# Patient Record
Sex: FEMALE | Race: BLACK OR AFRICAN AMERICAN
[De-identification: names, ages, dates, MRNs, and addresses within clinical notes are randomized per-mention and may not be internally consistent; named-entity substitution may affect disease eponyms.]

---

## 2017-01-15 ENCOUNTER — HOSPITAL ENCOUNTER (EMERGENCY)
Dept: HOSPITAL 61 - ER | Age: 22
Discharge: HOME | End: 2017-01-15
Payer: SELF-PAY

## 2017-01-15 VITALS — WEIGHT: 163 LBS | BODY MASS INDEX: 32.86 KG/M2 | HEIGHT: 59 IN

## 2017-01-15 VITALS — SYSTOLIC BLOOD PRESSURE: 127 MMHG | DIASTOLIC BLOOD PRESSURE: 76 MMHG

## 2017-01-15 DIAGNOSIS — M25.522: Primary | ICD-10-CM
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
## 2017-01-15 DIAGNOSIS — F12.10: ICD-10-CM
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
## 2017-01-15 DIAGNOSIS — M25.562: ICD-10-CM

## 2017-01-15 LAB
BACTERIA #/AREA URNS HPF: (no result) /HPF
BILIRUB UR QL STRIP: (no result)
GLUCOSE UR STRIP-MCNC: NEGATIVE MG/DL
NEG OBC UR: (no result)
NITRITE UR QL STRIP: NEGATIVE
PH UR STRIP: 6 [PH]
POS OBC UR: (no result)
PROT UR STRIP-MCNC: 30 MG/DL
RBC #/AREA URNS HPF: (no result) /HPF (ref 0–2)
SP GR UR STRIP: >=1.03
SQUAMOUS #/AREA URNS LPF: (no result) /LPF
UROBILINOGEN UR-MCNC: 1 MG/DL
WBC #/AREA URNS HPF: (no result) /HPF (ref 0–4)

## 2017-01-15 PROCEDURE — 81025 URINE PREGNANCY TEST: CPT

## 2017-01-15 PROCEDURE — 73080 X-RAY EXAM OF ELBOW: CPT

## 2017-01-15 PROCEDURE — 87086 URINE CULTURE/COLONY COUNT: CPT

## 2017-01-15 PROCEDURE — 81001 URINALYSIS AUTO W/SCOPE: CPT

## 2017-01-15 PROCEDURE — 73090 X-RAY EXAM OF FOREARM: CPT

## 2017-01-15 PROCEDURE — 70486 CT MAXILLOFACIAL W/O DYE: CPT

## 2017-01-15 PROCEDURE — 73564 X-RAY EXAM KNEE 4 OR MORE: CPT
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
## 2017-03-20 ENCOUNTER — HOSPITAL ENCOUNTER (EMERGENCY)
Dept: HOSPITAL 61 - ER | Age: 22
Discharge: HOME | End: 2017-03-20
Payer: SELF-PAY

## 2017-03-20 VITALS — DIASTOLIC BLOOD PRESSURE: 74 MMHG | SYSTOLIC BLOOD PRESSURE: 120 MMHG

## 2017-03-20 VITALS — BODY MASS INDEX: 32.66 KG/M2 | HEIGHT: 59 IN | WEIGHT: 162 LBS

## 2017-03-20 DIAGNOSIS — R11.2: ICD-10-CM

## 2017-03-20 DIAGNOSIS — R10.30: Primary | ICD-10-CM

## 2017-03-20 DIAGNOSIS — F12.10: ICD-10-CM

## 2017-03-20 LAB
BACTERIA #/AREA URNS HPF: (no result) /HPF
BILIRUB UR QL STRIP: NEGATIVE
GLUCOSE UR STRIP-MCNC: NEGATIVE MG/DL
NITRITE UR QL STRIP: NEGATIVE
PH UR STRIP: 6 [PH]
PROT UR STRIP-MCNC: NEGATIVE MG/DL
RBC #/AREA URNS HPF: 0 /HPF (ref 0–2)
SP GR UR STRIP: >=1.03
SQUAMOUS #/AREA URNS LPF: (no result) /LPF
UROBILINOGEN UR-MCNC: 1 MG/DL
WBC #/AREA URNS HPF: (no result) /HPF (ref 0–4)

## 2017-03-20 PROCEDURE — 81025 URINE PREGNANCY TEST: CPT

## 2017-03-20 PROCEDURE — 87086 URINE CULTURE/COLONY COUNT: CPT

## 2017-03-20 PROCEDURE — 81001 URINALYSIS AUTO W/SCOPE: CPT

## 2017-03-20 PROCEDURE — 99284 EMERGENCY DEPT VISIT MOD MDM: CPT

## 2017-03-20 NOTE — PHYS DOC
Past Medical History


Past Medical History:  No Pertinent History


Past Surgical History:  No Surgical History


Alcohol Use:  None


Drug Use:  Marijuana





Adult General


Chief Complaint


Chief Complaint:  ABDOMINAL PAIN





HPI


HPI


Patient is a 21  year old female who presents with lower abdominal pain and 

intermittent nausea with concern of pregnancy. Symptoms have been intermittent 

over the past 2 weeks. States she is 2 days late for her menstrual cycle, but 

she has some vaginal spotting over this time. Only one episode of emesis today, 

nonbloody nonbilious.  She denies fever or chills, diarrhea, dysuria, hematuria

, constipation.





Review of Systems


Review of Systems





Constitutional: Denies fever or chills []


Eyes: Denies change in visual acuity, redness, or eye pain []


HENT: Denies nasal congestion or sore throat []


Respiratory: Denies cough or shortness of breath []


Cardiovascular: No additional information not addressed in HPI []


GI: Denies bloody stools or diarrhea []


: Denies dysuria or hematuria []


Musculoskeletal: Denies back pain or joint pain []


Integument: Denies rash or skin lesions []


Neurologic: Denies headache, focal weakness or sensory changes []


Endocrine: Denies polyuria or polydipsia []





Allergies


Allergies





 Allergies








Coded Allergies Type Severity Reaction Last Updated Verified


 


  No Known Drug Allergies    6/28/16 No











Physical Exam


Physical Exam





Constitutional: Well developed, well nourished, no acute distress, non-toxic 

appearance. []


HENT: Normocephalic, atraumatic, bilateral external ears normal, oropharynx 

moist, nose normal. []


Eyes: PERRLA, EOMI. [] 


Neck: Normal range of motion, supple. [] 


Cardiovascular:Heart rate regular rhythm []


Lungs & Thorax:  Bilateral breath sounds clear to auscultation []


Abdomen: Bowel sounds normal, soft, no tenderness. [] 


Skin: Warm, dry, no erythema, no rash. [] 


Back: No tenderness, no CVA tenderness. [] 


Extremities: No tenderness, ROM intact, no edema. [] 


Neurologic: Alert and oriented X 3, normal motor function, normal sensory 

function, no focal deficits noted. []


Psychologic: Affect normal, judgement normal, mood normal. []





Current Patient Data


Vital Signs





 Vital Signs








  Date Time  Temp Pulse Resp B/P Pulse Ox O2 Delivery O2 Flow Rate FiO2


 


3/20/17 21:39 98.0 57 18 120/74 99 Room Air  





 98.0       








Lab Values





 Laboratory Tests








Test


  3/20/17


21:40


 


Urine Color Yellow  


 


Urine Clarity Clear  


 


Urine pH 6.0  


 


Urine Specific Gravity >=1.030  


 


Urine Protein


  Negativemg/dL


(NEG-TRACE)


 


Urine Glucose (UA)


  Negativemg/dL


(NEG)


 


Urine Ketones (Stick)


  Negativemg/dL


(NEG)


 


Urine Blood


  Negative (NEG)


 


 


Urine Nitrite


  Negative (NEG)


 


 


Urine Bilirubin


  Negative (NEG)


 


 


Urine Urobilinogen Dipstick


  1.0mg/dL (0.2


mg/dL)


 


Urine Leukocyte Esterase Trace (NEG)  


 


Urine RBC 0/HPF (0-2)  


 


Urine WBC 1-4/HPF (0-4)  


 


Urine Squamous Epithelial


Cells Many/LPF  


 


 


Urine Bacteria


  Moderate/HPF


(0-FEW)


 


Urine Mucus Mod/LPF  











Course & Med Decision Making


Course & Med Decision Making


Pertinent Labs and Imaging studies reviewed. (See chart for details)





Told pregnancy test is negative by nursing.  UA is otherwise unremarkable.  

Discussed supportive care for symptoms.  Return precautions given. She 

understands and agrees with plan.





Dragon Disclaimer


Dragon Disclaimer


This electronic medical record was generated, in whole or in part, using a 

voice recognition dictation system.





Departure


Departure


Impression:  


 Primary Impression:  


 Lower abdominal pain


 Additional Impression:  


 Nausea and vomiting


Disposition:  01 HOME, SELF-CARE


Condition:  STABLE


Referrals:  


NO PCP (PCP)


Patient Instructions:  Abdominal Pain, Easy-to-Read





Additional Instructions:


Take Tylenol or ibuprofen as needed for pain. Drink liquids to stay hydrated. 

Follow-up with your primary care doctor. Return for any concerns.





Problem Qualifiers








 Additional Impression:  


 Nausea and vomiting


 Vomiting type:  unspecified  Vomiting Intractability:  non-intractable  

Qualified Code:  R11.2 - Nausea with vomiting, unspecified





LANA JOSEPH MD Mar 20, 2017 22:24

## 2017-06-24 ENCOUNTER — HOSPITAL ENCOUNTER (EMERGENCY)
Dept: HOSPITAL 61 - ER | Age: 22
Discharge: HOME | End: 2017-06-24
Payer: SELF-PAY

## 2017-06-24 VITALS — WEIGHT: 145 LBS | BODY MASS INDEX: 29.23 KG/M2 | HEIGHT: 59 IN

## 2017-06-24 VITALS — DIASTOLIC BLOOD PRESSURE: 59 MMHG | SYSTOLIC BLOOD PRESSURE: 104 MMHG

## 2017-06-24 DIAGNOSIS — M25.511: ICD-10-CM

## 2017-06-24 DIAGNOSIS — F12.10: ICD-10-CM

## 2017-06-24 DIAGNOSIS — J02.9: ICD-10-CM

## 2017-06-24 DIAGNOSIS — R07.89: Primary | ICD-10-CM

## 2017-06-24 DIAGNOSIS — R05: ICD-10-CM

## 2017-06-24 PROCEDURE — 96361 HYDRATE IV INFUSION ADD-ON: CPT

## 2017-06-24 PROCEDURE — 71020: CPT

## 2017-06-24 PROCEDURE — 81025 URINE PREGNANCY TEST: CPT

## 2017-06-24 PROCEDURE — 96374 THER/PROPH/DIAG INJ IV PUSH: CPT

## 2017-06-24 PROCEDURE — 99284 EMERGENCY DEPT VISIT MOD MDM: CPT

## 2017-06-24 PROCEDURE — 93005 ELECTROCARDIOGRAM TRACING: CPT

## 2017-06-24 NOTE — EKG
[facility]

              [address] [place] [address]

Test Date:    2017               Test Time:    02:10:55

Pat Name:     RODNEY ROACH             Department:   

Patient ID:   [id number]           Room:          

Gender:       F                        Technician:   

:          1995               Requested By: LANA JOSEPH

Order Number: [id number]            Reading MD:     

                                 Measurements

Intervals                              Axis          

Rate:         53                       P:            

NV:                                    QRS:          61

QRSD:         90                       T:            42

QT:           392                                    

QTc:          370                                    

                           Interpretive Statements

SINUS BRADYCARDIA

RI6.01          Unconfirmed report

No previous ECG available for comparison

## 2017-06-24 NOTE — RAD
PA AND LATERAL CHEST RADIOGRAPH



Clinical Indication: Severe right upper chest pain. Shortness of air today.



Comparison: Two-view chest 5/17/2010.



Findings: 

The cardiomediastinal silhouette is normal. Pulmonary vasculature is normal.

The lungs are clear.  No pleural effusion or pneumothorax is seen. There is no

acute bone abnormality.  



IMPRESSION: 

No acute cardiopulmonary process.
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
## 2017-10-29 ENCOUNTER — HOSPITAL ENCOUNTER (EMERGENCY)
Dept: HOSPITAL 61 - ER | Age: 22
Discharge: HOME | End: 2017-10-29
Payer: SELF-PAY

## 2017-10-29 VITALS — WEIGHT: 137 LBS | HEIGHT: 59 IN | BODY MASS INDEX: 27.62 KG/M2

## 2017-10-29 VITALS — DIASTOLIC BLOOD PRESSURE: 73 MMHG | SYSTOLIC BLOOD PRESSURE: 125 MMHG

## 2017-10-29 DIAGNOSIS — R00.0: ICD-10-CM

## 2017-10-29 DIAGNOSIS — R04.2: ICD-10-CM
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
## 2017-10-29 DIAGNOSIS — R10.13: Primary | ICD-10-CM
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
## 2017-10-29 DIAGNOSIS — N93.9: ICD-10-CM
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
## 2017-10-29 LAB
ALBUMIN SERPL-MCNC: 4.2 G/DL (ref 3.4–5)
ALP SERPL-CCNC: 84 U/L (ref 46–116)
ALT SERPL-CCNC: 10 U/L (ref 14–59)
ANION GAP SERPL CALC-SCNC: 13 MMOL/L (ref 6–14)
AST SERPL-CCNC: 14 U/L (ref 15–37)
BACTERIA #/AREA URNS HPF: (no result) /HPF
BASOPHILS # BLD AUTO: 0 X10^3/UL (ref 0–0.2)
BASOPHILS NFR BLD: 1 % (ref 0–3)
BILIRUB DIRECT SERPL-MCNC: 0.1 MG/DL (ref 0–0.2)
BILIRUB SERPL-MCNC: 0.4 MG/DL (ref 0.2–1)
BILIRUB UR QL STRIP: NEGATIVE
BUN SERPL-MCNC: 15 MG/DL (ref 7–20)
CALCIUM SERPL-MCNC: 9.6 MG/DL (ref 8.5–10.1)
CHLORIDE SERPL-SCNC: 103 MMOL/L (ref 98–107)
CO2 SERPL-SCNC: 25 MMOL/L (ref 21–32)
CREAT SERPL-MCNC: 0.9 MG/DL (ref 0.6–1)
EOSINOPHIL NFR BLD: 3 % (ref 0–3)
ERYTHROCYTE [DISTWIDTH] IN BLOOD BY AUTOMATED COUNT: 13.7 % (ref 11.5–14.5)
GFR SERPLBLD BASED ON 1.73 SQ M-ARVRAT: 93.9 ML/MIN
GLUCOSE SERPL-MCNC: 76 MG/DL (ref 70–99)
GLUCOSE UR STRIP-MCNC: NEGATIVE MG/DL
HCT VFR BLD CALC: 39.1 % (ref 36–47)
HGB BLD-MCNC: 13.3 G/DL (ref 12–15.5)
LYMPHOCYTES # BLD: 1.8 X10^3/UL (ref 1–4.8)
LYMPHOCYTES NFR BLD AUTO: 21 % (ref 24–48)
MCH RBC QN AUTO: 32 PG (ref 25–35)
MCHC RBC AUTO-ENTMCNC: 34 G/DL (ref 31–37)
MCV RBC AUTO: 93 FL (ref 79–100)
MONOCYTES NFR BLD: 5 % (ref 0–9)
NEUTROPHILS NFR BLD AUTO: 71 % (ref 31–73)
NITRITE UR QL STRIP: NEGATIVE
PH UR STRIP: 6 [PH]
PLATELET # BLD AUTO: 238 X10^3/UL (ref 140–400)
POTASSIUM SERPL-SCNC: 3.7 MMOL/L (ref 3.5–5.1)
PROT SERPL-MCNC: 8 G/DL (ref 6.4–8.2)
PROT UR STRIP-MCNC: 100 MG/DL
RBC # BLD AUTO: 4.19 X10^6/UL (ref 3.5–5.4)
RBC #/AREA URNS HPF: 0 /HPF (ref 0–2)
SODIUM SERPL-SCNC: 141 MMOL/L (ref 136–145)
SP GR UR STRIP: 1.02
SQUAMOUS #/AREA URNS LPF: (no result) /LPF
UROBILINOGEN UR-MCNC: 0.2 MG/DL
WBC # BLD AUTO: 8.4 X10^3/UL (ref 4–11)
WBC #/AREA URNS HPF: >40 /HPF (ref 0–4)

## 2017-10-29 PROCEDURE — 96360 HYDRATION IV INFUSION INIT: CPT

## 2017-10-29 PROCEDURE — 99285 EMERGENCY DEPT VISIT HI MDM: CPT

## 2017-10-29 PROCEDURE — 81001 URINALYSIS AUTO W/SCOPE: CPT

## 2017-10-29 PROCEDURE — 36415 COLL VENOUS BLD VENIPUNCTURE: CPT

## 2017-10-29 PROCEDURE — 83690 ASSAY OF LIPASE: CPT

## 2017-10-29 PROCEDURE — 74177 CT ABD & PELVIS W/CONTRAST: CPT

## 2017-10-29 PROCEDURE — 85025 COMPLETE CBC W/AUTO DIFF WBC: CPT

## 2017-10-29 PROCEDURE — 80076 HEPATIC FUNCTION PANEL: CPT

## 2017-10-29 PROCEDURE — 87186 SC STD MICRODIL/AGAR DIL: CPT

## 2017-10-29 PROCEDURE — 87086 URINE CULTURE/COLONY COUNT: CPT

## 2017-10-29 PROCEDURE — 81025 URINE PREGNANCY TEST: CPT

## 2017-10-29 PROCEDURE — 84702 CHORIONIC GONADOTROPIN TEST: CPT

## 2017-10-29 PROCEDURE — 80048 BASIC METABOLIC PNL TOTAL CA: CPT
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
## 2017-11-02 NOTE — VNOTE
CALL BACK NOTE


CALL BACK





Microbiology


10/29/17 Urine Culture - Final, Complete


           


10/29/17 Urine Culture Result 1 ([facility]) - Final, Complete


           


10/29/17 Urine Culture Result 2 ([facility]) - Final, Complete


           


10/29/17 Antimicrobic Susceptibility - Final, Complete


           


Patients urine positive for E. coli and hemolytic strep B. Patient will need to 

be treated with augmentin. Called patient with message left.











PRIETO MENDEZ APRN Nov 2, 2017 15:59

## 2017-11-15 ENCOUNTER — HOSPITAL ENCOUNTER (EMERGENCY)
Dept: HOSPITAL 61 - ER | Age: 22
Discharge: HOME | End: 2017-11-15
Payer: SELF-PAY

## 2017-11-15 VITALS
DIASTOLIC BLOOD PRESSURE: 60 MMHG | SYSTOLIC BLOOD PRESSURE: 123 MMHG | SYSTOLIC BLOOD PRESSURE: 123 MMHG | DIASTOLIC BLOOD PRESSURE: 60 MMHG

## 2017-11-15 VITALS — BODY MASS INDEX: 28.22 KG/M2 | HEIGHT: 59 IN | WEIGHT: 140 LBS

## 2017-11-15 DIAGNOSIS — M25.511: ICD-10-CM
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
## 2017-11-15 DIAGNOSIS — M54.89: ICD-10-CM
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
## 2017-11-15 DIAGNOSIS — F12.10: ICD-10-CM

## 2017-11-15 DIAGNOSIS — M54.2: Primary | ICD-10-CM
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
## 2017-11-15 DIAGNOSIS — M25.561: ICD-10-CM

## 2017-11-15 DIAGNOSIS — N39.0: ICD-10-CM
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
## 2017-11-15 LAB
BACTERIA #/AREA URNS HPF: (no result) /HPF
BILIRUB UR QL STRIP: NEGATIVE
GLUCOSE UR STRIP-MCNC: NEGATIVE MG/DL
NITRITE UR QL STRIP: POSITIVE
PH UR STRIP: 6 [PH]
PROT UR STRIP-MCNC: >=300 MG/DL
RBC #/AREA URNS HPF: (no result) /HPF (ref 0–2)
SP GR UR STRIP: 1.02
SQUAMOUS #/AREA URNS LPF: (no result) /LPF
UROBILINOGEN UR-MCNC: 1 MG/DL

## 2017-11-15 PROCEDURE — 70360 X-RAY EXAM OF NECK: CPT

## 2017-11-15 PROCEDURE — 87086 URINE CULTURE/COLONY COUNT: CPT

## 2017-11-15 PROCEDURE — 72072 X-RAY EXAM THORAC SPINE 3VWS: CPT

## 2017-11-15 PROCEDURE — 73564 X-RAY EXAM KNEE 4 OR MORE: CPT

## 2017-11-15 PROCEDURE — 70450 CT HEAD/BRAIN W/O DYE: CPT

## 2017-11-15 PROCEDURE — 87186 SC STD MICRODIL/AGAR DIL: CPT

## 2017-11-15 PROCEDURE — 72125 CT NECK SPINE W/O DYE: CPT

## 2017-11-15 PROCEDURE — 81001 URINALYSIS AUTO W/SCOPE: CPT

## 2017-11-15 PROCEDURE — 72100 X-RAY EXAM L-S SPINE 2/3 VWS: CPT

## 2017-11-15 PROCEDURE — 81025 URINE PREGNANCY TEST: CPT
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
## 2018-08-16 ENCOUNTER — HOSPITAL ENCOUNTER (EMERGENCY)
Dept: HOSPITAL 61 - ER | Age: 23
Discharge: HOME | End: 2018-08-16
Payer: SELF-PAY

## 2018-08-16 VITALS — BODY MASS INDEX: 30.24 KG/M2 | HEIGHT: 59 IN | WEIGHT: 150 LBS

## 2018-08-16 DIAGNOSIS — Z20.2: Primary | ICD-10-CM

## 2018-08-16 PROCEDURE — 99283 EMERGENCY DEPT VISIT LOW MDM: CPT

## 2018-08-16 PROCEDURE — 96372 THER/PROPH/DIAG INJ SC/IM: CPT

## 2018-08-16 PROCEDURE — 87591 N.GONORRHOEAE DNA AMP PROB: CPT

## 2018-08-16 PROCEDURE — 87491 CHLMYD TRACH DNA AMP PROBE: CPT

## 2018-08-16 PROCEDURE — 99284 EMERGENCY DEPT VISIT MOD MDM: CPT

## 2018-08-16 NOTE — PHYS DOC
Past Medical History


Past Medical History:  No Pertinent History


Past Surgical History:  No Surgical History


Additional Past Surgical Histo:  I&D ABSCESS


Alcohol Use:  Occasionally


Drug Use:  Marijuana





Adult General


Chief Complaint


Chief Complaint:  SEXUALLY TRANSMITTED DISEASE





HPI


HPI





Patient is a 23  year old female with no significant medical history presents 

today concerned for STDs. Patient states the partner notified her he was 

treated for STDs, patient has no symptoms.





Review of Systems


Review of Systems





Constitutional: Denies fever or chills []


Eyes: Denies change in visual acuity, redness, or eye pain []


HENT: Denies nasal congestion or sore throat []


Respiratory: Denies cough or shortness of breath []


Cardiovascular: No additional information not addressed in HPI []


GI: Denies abdominal pain, nausea, vomiting, bloody stools or diarrhea []


: Reports STD concern. Denies dysuria or hematuria []


Musculoskeletal: Denies back pain or joint pain []


Integument: Denies rash or skin lesions []


Neurologic: Denies headache, focal weakness or sensory changes []


Endocrine: Denies polyuria or polydipsia []





All other systems were reviewed and found to be within normal limits, except as 

documented in this note.





Allergies


Allergies





Allergies








Coded Allergies Type Severity Reaction Last Updated Verified


 


  No Known Drug Allergies    6/28/16 No











Physical Exam


Physical Exam





Constitutional: Well developed, well nourished, no acute distress, non-toxic 

appearance. []


HENT: Normocephalic, atraumatic, bilateral external ears normal, oropharynx 

moist, no oral exudates, nose normal. []


Eyes: PERRLA, EOMI, conjunctiva normal, no discharge. [] 


Neck: Normal range of motion, no tenderness, supple, no stridor. [] 


Cardiovascular:Heart rate regular rhythm, no murmur []


Lungs & Thorax:  Bilateral breath sounds clear to auscultation []


Abdomen: Bowel sounds normal, soft, no tenderness, no masses, no pulsatile 

masses. [] 


Pelvic exam


External pelvic appears normal, cervix is closed, no CMT, no adnexal tenderness

, trace white discharge in the vaginal vault.


Skin: Warm, dry, no erythema, no rash. [] 


Back: No tenderness, no CVA tenderness. [] 


Extremities: No tenderness, no cyanosis, no clubbing, ROM intact, no edema. [] 


Neurologic: Alert and oriented X 3, normal motor function, normal sensory 

function, no focal deficits noted. []


Psychologic: Affect normal, judgement normal, mood normal. []





EKG


EKG


[]





Radiology/Procedures


Radiology/Procedures


[]





Course & Med Decision Making


Course & Med Decision Making


Pertinent Labs and Imaging studies reviewed. (See chart for details)





This is a 23-year-old female patient presenting to the ED today to be treated 

for STDs. Patient was offered prophylaxis treatment. STD education provided. 

Follow-up with the health department or PCP as needed.





Dragon Disclaimer


Dragon Disclaimer


This electronic medical record was generated, in whole or in part, using a 

voice recognition dictation system.





Departure


Departure


Impression:  


 Primary Impression:  


 Concern about STD in female without diagnosis


Disposition:  01 HOME, SELF-CARE


Condition:  STABLE


Referrals:  


BECKIE HEMPHILL MD (PCP)


Follow-up with your doctor as needed


Patient Instructions:  Sexually Transmitted Disease, Easy-to-Read





Additional Instructions:  


You were treated for sexually transmitted diseases in the emergency room. Use 

protection at all times. Do not have sex for 7 days. Contact all your sex 

partners, let them know you were treated for STDs and ask them to seek 

treatment too.











ONDINA MARTINES APRCHIN Aug 16, 2018 10:11

## 2019-03-14 ENCOUNTER — HOSPITAL ENCOUNTER (EMERGENCY)
Dept: HOSPITAL 61 - ER | Age: 24
Discharge: HOME | End: 2019-03-14
Payer: SELF-PAY

## 2019-03-14 VITALS — DIASTOLIC BLOOD PRESSURE: 78 MMHG | SYSTOLIC BLOOD PRESSURE: 118 MMHG

## 2019-03-14 VITALS — WEIGHT: 135 LBS | HEIGHT: 59 IN | BODY MASS INDEX: 27.21 KG/M2

## 2019-03-14 DIAGNOSIS — M54.6: Primary | ICD-10-CM

## 2019-03-14 DIAGNOSIS — F17.200: ICD-10-CM

## 2019-03-14 PROCEDURE — 99283 EMERGENCY DEPT VISIT LOW MDM: CPT

## 2019-03-14 PROCEDURE — 72072 X-RAY EXAM THORAC SPINE 3VWS: CPT

## 2019-03-14 PROCEDURE — 81025 URINE PREGNANCY TEST: CPT

## 2019-03-14 NOTE — PHYS DOC
Past Medical History


Past Medical History:  No Pertinent History


Past Surgical History:  Other


Additional Past Surgical Histo:  I&D ABSCESS


Additional Information:  


0.5 PPD


Alcohol Use:  Occasionally


Drug Use:  Marijuana





Adult General


Chief Complaint


Chief Complaint:  BACK PAIN OR INJURY





[facility]


HPI





Patient is a 23  year old female who presents to the emergency room with 

complaints of mid back pain for the last six months. she states that the pain 

increased today.She denies any injury. Patient denies any cough, shortness of 

breath, wheezing, or chest pain. She states it feels like her back is out of 

place. Currently she rates the pain a 10 out of 10 on the pain scale she denies 

any alleviating or exacerbating factors.





Review of Systems


Review of Systems





Constitutional: Denies fever or chills []


Eyes: Denies change in visual acuity, redness, or eye pain []


HENT: Denies nasal congestion or sore throat []


Respiratory: Denies cough or shortness of breath []


Cardiovascular: No additional information not addressed in HPI []


GI: Denies abdominal pain, nausea, vomiting,  or diarrhea []


: Denies dysuria or hematuria []


Musculoskeletal: See HPI


Integument: Denies rash or skin lesions []


Neurologic: Denies headache, focal weakness or sensory changes []





Complete systems were reviewed and found to be within normal limits, except as 

documented in this note.





Allergies


Allergies





Allergies








Coded Allergies Type Severity Reaction Last Updated Verified


 


  No Known Drug Allergies    6/28/16 No











Physical Exam


Physical Exam





Constitutional: Well developed, well nourished, no acute distress, non-toxic 

appearance. []


HENT: Normocephalic, atraumatic, bilateral external ears normal, oropharynx 

moist, no oral exudates, nose normal. []


Eyes: PERRLA, conjunctiva normal, no discharge. [] 


Neck: Normal range of motion, no tenderness, supple, no stridor. [] 


Lungs & Thorax:  Bilateral breath sounds clear to auscultation []


Skin: Warm, dry, no erythema, no rash. [] 


Back: No CVA tenderness; thoracic bony TTP, no step off, no crepitus, no 

bruising


Extremities: No cyanosis, ROM intact, no edema. [] 


Neurologic: Alert and oriented X 3, normal motor function, normal sensory 

function, no focal deficits noted. []


Psychologic: Affect normal, judgement normal, mood normal. []





Current Patient Data


Vital Signs





Lab Values





                                Laboratory Tests








Test


 3/14/19


14:32


 


POC Urine HCG, Qualitative


 Hcg negative


(Negative)











EKG


EKG


[]





Radiology/Procedures


Radiology/Procedures


PROCEDURE: THORACIC SPINE 3V





THORACIC SPINE 3V


 


History: UPPER BACK PAIN, NO INJURY, "FEELS LIKE IT IS OUT OF PLACE".


 


Comparison with November 15, 2017 images although that report is not 


available


 


Vertebral body height and alignment appear intact. The uppermost thoracic 


spine is poorly seen on the lateral view and swimmer's view.


 


No evidence of paraspinal soft tissue swelling. Disc spaces appear 


maintained.


 


IMPRESSION: No evidence of acute fracture or subluxation. MR of the 


thoracic spine could provide further evaluation, as indicated.[]





Course & Med Decision Making


Course & Med Decision Making


Pertinent Labs and Imaging studies reviewed. (See chart for details)





[]





Dragon Disclaimer


Dragon Disclaimer


This electronic medical record was generated, in whole or in part, using a voice

 recognition dictation system.





Departure


Departure


Impression:  


   Primary Impression:  


   Acute thoracic back pain


Disposition:  01 HOME, SELF-CARE


Condition:  STABLE


Referrals:  


NO PCP (PCP)


Patient Instructions:  Thoracic Strain, Easy-to-Read





Additional Instructions:  


Fill the prescriptions and use as directed. Activity as tolerated. Follow up 

with primary care doctor if symptoms persist, return to the ER if symptoms 

worsen.


Scripts


Cyclobenzaprine Hcl (CYCLOBENZAPRINE HCL) 10 Mg Tablet


1 TAB PO TID PRN for PAIN for 10 Days, #30 TAB 0 Refills


   Prov: CYNTHIA MCDOWELL APRN         3/14/19 


Naproxen (NAPROXEN) 500 Mg Tablet


1 TAB PO BID PRN for PAIN for 10 Days, #20 TAB 0 Refills


   Prov: CYNTHIA MCDOWELL APRN         3/14/19





Problem Qualifiers








   Primary Impression:  


   Acute thoracic back pain


   Back pain laterality:  midline  Qualified Codes:  M54.6 - Pain in thoracic 

   spine








CYNTHIA MCDOWELL APRN       Mar 14, 2019 15:21

## 2019-03-14 NOTE — RAD
THORACIC SPINE 3V

 

History: UPPER BACK PAIN, NO INJURY, "FEELS LIKE IT IS OUT OF PLACE".

 

Comparison with November 15, 2017 images although that report is not 

available

 

Vertebral body height and alignment appear intact. The uppermost thoracic 

spine is poorly seen on the lateral view and swimmer's view.

 

No evidence of paraspinal soft tissue swelling. Disc spaces appear 

maintained.

 

IMPRESSION: No evidence of acute fracture or subluxation. MR of the 

thoracic spine could provide further evaluation, as indicated.

 

Electronically signed by: Rommel Balderrama MD (3/14/2019 3:11 PM) [id number]

## 2019-03-22 ENCOUNTER — HOSPITAL ENCOUNTER (EMERGENCY)
Dept: HOSPITAL 61 - ER | Age: 24
Discharge: HOME | End: 2019-03-22
Payer: SELF-PAY

## 2019-03-22 VITALS — WEIGHT: 130 LBS | HEIGHT: 59 IN | BODY MASS INDEX: 26.21 KG/M2

## 2019-03-22 VITALS — SYSTOLIC BLOOD PRESSURE: 126 MMHG | DIASTOLIC BLOOD PRESSURE: 73 MMHG

## 2019-03-22 DIAGNOSIS — Z20.2: Primary | ICD-10-CM

## 2019-03-22 PROCEDURE — 81025 URINE PREGNANCY TEST: CPT

## 2019-03-22 PROCEDURE — 96372 THER/PROPH/DIAG INJ SC/IM: CPT

## 2019-03-22 PROCEDURE — 99283 EMERGENCY DEPT VISIT LOW MDM: CPT

## 2019-03-22 NOTE — PHYS DOC
Past Medical History


Past Medical History:  No Pertinent History


Past Surgical History:  No Surgical History


Additional Past Surgical Histo:  I&D ABSCESS


Alcohol Use:  None


Drug Use:  Marijuana





Adult General


Chief Complaint


Chief Complaint:  SEXUALLY TRANSMITTED DISEASE





HPI


HPI





Patient is a 23  year old AA female who presents to the emergency room today 

with need to be treated for suspected sexually transmitted infection. Patient 

states her boyfriend received a call from a past partner who informed him that 

she had a sexually transmitted infection. The patient is unsure of which STD 

her boyfriend was exposed to. She denies any dysuria, increased urinary 

frequency, hematuria, irregular vaginal discharge, vaginal odor, pelvic pain, 

abdominal pain, or low back pain. Patient does not wish to have a pelvic exam 

today. She denies any pain at this time.





Review of Systems


Review of Systems





Constitutional: Denies fever or chills []


GI: Denies abdominal pain, nausea, vomiting, or diarrhea []


: Denies dysuria or hematuria []


Musculoskeletal: Denies back pain 


Integument: Denies rash or skin lesions []


Neurologic: Denies headache





Current Medications


Current Medications





Current Medications








 Medications


  (Trade)  Dose


 Ordered  Sig/Elizabeth  Start Time


 Stop Time Status Last Admin


Dose Admin


 


 Azithromycin


  (Zithromax)  1,000 mg  1X  ONCE  3/22/19 14:30


 3/22/19 14:31 DC 3/22/19 14:28


1,000 MG


 


 Ceftriaxone Sodium


  (Rocephin Im)  250 mg  1X  ONCE  3/22/19 14:30


 3/22/19 14:31 DC 3/22/19 14:28


250 MG











Allergies


Allergies





Allergies








Coded Allergies Type Severity Reaction Last Updated Verified


 


  No Known Drug Allergies    6/28/16 No











Physical Exam


Physical Exam





Constitutional: Well developed, well nourished, no acute distress, non-toxic 

appearance. []


HENT: Normocephalic, atraumatic, bilateral external ears normal, nose normal. []


Eyes: conjunctiva normal, no discharge. [] 


Neck: Normal range of motion, no stridor. [] 


Lungs & Thorax:  Respirations even and unlabored, no retractions, no 

respiratory distress


Skin: Warm, dry, no erythema, no rash. [] 


Extremities: No cyanosis,  ROM intact, no edema. [] 


Neurologic: Alert and oriented X 3, no focal deficits noted. []


Psychologic: Affect normal, judgement normal, mood normal. []





Current Patient Data


Vital Signs





 Vital Signs








  Date Time  Temp Pulse Resp B/P (MAP) Pulse Ox O2 Delivery O2 Flow Rate FiO2


 


3/22/19 13:10 98.1 84 18 126/73 (90) 18 Room Air  





 98.1       








Lab Values





 Laboratory Tests








Test


 3/22/19


13:30


 


POC Urine HCG, Qualitative


 Hcg negative


(Negative)











EKG


EKG


[]





Radiology/Procedures


Radiology/Procedures


[]





Course & Med Decision Making


Course & Med Decision Making


Pertinent Labs and Imaging studies reviewed. (See chart for details)


dx: Contact with an suspected exposure to sexually transmitted infection


Patient was treated prophylactically with 250 mg of IM Rocephin, and 1 g of PO 

Zithromax. Patient was instructed to avoid having intercourse until the results 

of gonorrhea and chlamydia testing were available, patient was notified that 

these results would not be available for 48 hours. If one or both of these 

tests is positive, patient needs to refrain from intercourse for approximately 

2 weeks following the treatment of any current partners. 


[]





Dragon Disclaimer


Dragon Disclaimer


This electronic medical record was generated, in whole or in part, using a 

voice recognition dictation system.





Departure


Departure


Impression:  


 Primary Impression:  


 Contact with and (suspected) exposure to infections with a predominantly 

sexual mode of transmission


 Additional Impression:  


 Possible exposure to STD


Disposition:  01 HOME, SELF-CARE


Condition:  STABLE


Referrals:  


NO PCP (PCP)


Patient Instructions:  Sexually Transmitted Disease, Easy-to-Read





Additional Instructions:  


Recommend that you go to your local health department for comprehensive 

sexually transmitted disease testing. You have been treated for a suspected 

gonorrhea and chlamydia. Avoid having intercourse until the results of 

gonorrhea and chlamydia testing are available, these results will not be 

available for 48 hours. If one or both of these tests is positive, you need to 

refrain from intercourse for approximately 1 week following the treatment of 

any current partners. Follow-up with your primary care doctor if symptoms 

persist, return to ER symptoms worsen.





Problem Qualifiers











CYNTHIA MCDOWELL APRN Mar 22, 2019 14:17

## 2022-09-08 NOTE — RAD
CT ABDOMEN AND PELVIS WITH IV CONTRAST  



History: abd pain after trauma, coughing up blood



Comparison: CT abdomen and pelvis dated 11/7/2011



Technique: After administration of intravenous contrast, helical CT of the

abdomen and pelvis was performed from the lung bases through the ischial

tuberosities. Sagittal and coronal reconstructions were obtained. 75 mL of

Omnipaque 300 were used. 



Abdomen Findings:

Patient motion.



 The visualized lung bases are clear.



    The liver, spleen, pancreas, gallbladder, and bilateral adrenal glands are

normal. 



Subcentimeter hypoattenuating lesion within the interpolar region of the left

kidney is too small to characterize but statistically likely represents a

simple renal cyst. Bilateral kidneys otherwise enhance symmetrically. There is

no focal renal mass. There is no hydronephrosis.   



    Visualized loops of large and small bowel are normal. There is no evidence

of bowel obstruction. The appendix is not identified with certainty but there

are no inflammatory changes in its expected position.. There is no free fluid.



There is no abdominal or retroperitoneal adenopathy. The abdominal aorta is

normal in caliber. 



Pelvis findings:

    Urinary bladder is normal. Fluid within the endometrial canal is likely

physiologic given patient's age. Normal appearing bilateral ovaries for age.

There is no free fluid. 



There is no pelvic or inguinal adenopathy.  



There is no acute bony abnormality.  



IMPRESSION: Patient motion.



1. No acute intra-abdominal or intrapelvic process.

2. Fluid within the endometrial canal is likely physiologic given patient's

age.





PQRS Compliance Statement:



One or more of the following individualized dose reduction techniques were

utilized for this examination:

1. Automated exposure control

2. Adjustment of the mA and/or kV according to patient size

3. Use of iterative reconstruction technique















PQRS Compliance Statement:



One or more of the following individualized dose reduction techniques were

utilized for this examination:

1. Automated exposure control

2. Adjustment of the mA and/or kV according to patient size

3. Use of iterative reconstruction technique No